# Patient Record
Sex: FEMALE | URBAN - METROPOLITAN AREA
[De-identification: names, ages, dates, MRNs, and addresses within clinical notes are randomized per-mention and may not be internally consistent; named-entity substitution may affect disease eponyms.]

---

## 2021-08-02 ENCOUNTER — WEB ENCOUNTER (OUTPATIENT)
Dept: URBAN - METROPOLITAN AREA CLINIC 128 | Facility: CLINIC | Age: 56
End: 2021-08-02

## 2021-08-03 ENCOUNTER — DASHBOARD ENCOUNTERS (OUTPATIENT)
Age: 56
End: 2021-08-03

## 2021-08-03 ENCOUNTER — OFFICE VISIT (OUTPATIENT)
Dept: URBAN - METROPOLITAN AREA CLINIC 128 | Facility: CLINIC | Age: 56
End: 2021-08-03
Payer: COMMERCIAL

## 2021-08-03 VITALS
HEIGHT: 64 IN | TEMPERATURE: 97.3 F | BODY MASS INDEX: 27.25 KG/M2 | DIASTOLIC BLOOD PRESSURE: 87 MMHG | HEART RATE: 75 BPM | WEIGHT: 159.6 LBS | SYSTOLIC BLOOD PRESSURE: 147 MMHG

## 2021-08-03 DIAGNOSIS — Z80.0 FAMILY HISTORY OF ESOPHAGEAL CANCER: ICD-10-CM

## 2021-08-03 DIAGNOSIS — R13.19 CERVICAL DYSPHAGIA: ICD-10-CM

## 2021-08-03 DIAGNOSIS — K64.8 BLEEDING INTERNAL HEMORRHOIDS: ICD-10-CM

## 2021-08-03 PROBLEM — 40739000 DYSPHAGIA: Status: ACTIVE | Noted: 2021-08-03

## 2021-08-03 PROCEDURE — 99204 OFFICE O/P NEW MOD 45 MIN: CPT | Performed by: INTERNAL MEDICINE

## 2021-08-03 RX ORDER — TRAMADOL HYDROCHLORIDE 50 MG/1
1 TABLET AS NEEDED TABLET, FILM COATED ORAL ONCE A DAY
Status: ACTIVE | COMMUNITY

## 2021-08-03 RX ORDER — HYDROCORTISONE 25 MG/G
1 APPLICATION CREAM TOPICAL TWICE A DAY
Qty: 1 TUBE | Refills: 0 | OUTPATIENT
Start: 2021-08-03 | End: 2021-09-02

## 2021-08-03 RX ORDER — FAMOTIDINE 20 MG/1
1 TABLET AT BEDTIME TABLET, FILM COATED ORAL ONCE A DAY
Qty: 30 TABLET | Refills: 5 | OUTPATIENT
Start: 2021-08-03

## 2021-08-03 RX ORDER — ZOLMITRIPTAN 5 MG/1
AS DIRECTED SPRAY NASAL
Status: ACTIVE | COMMUNITY

## 2021-08-03 RX ORDER — LEVOTHYROXINE SODIUM 0.03 MG/1
1 TABLET IN THE MORNING ON AN EMPTY STOMACH TABLET ORAL ONCE A DAY
Status: ACTIVE | COMMUNITY

## 2021-08-03 RX ORDER — LOSARTAN POTASSIUM 25 MG/1
1 TABLET TABLET ORAL ONCE A DAY
Status: ACTIVE | COMMUNITY

## 2021-08-03 NOTE — HPI-OTHER HISTORIES
Patient elicits the following symptoms: Difficulty swallowing Duration of symptoms: several months Location of symptoms: esophageal Associated symptoms: difficulty swallowing pills that get stuck in her esophagus Prior over the counter or prescription medications: none Current stress: none Any recent weight changes: none Any recent changes in medications: none Any recent changes in diet: none Any past history of ulcers or Barretts esophagus: none Previous work up- labs, imaging: none Last EGD: 7-8 years ago Last colonoscopy: 7-8 years ago Her father had esophageal cancer Her grandmother had colon cancer. She had a recent thyroid ultrasound which was normal. She has hypothyroidism and is on levothyroxine and has had normal labs noted recently. She elicits having hemorrhoids, internal and external for years.